# Patient Record
Sex: FEMALE | Race: WHITE | URBAN - NONMETROPOLITAN AREA
[De-identification: names, ages, dates, MRNs, and addresses within clinical notes are randomized per-mention and may not be internally consistent; named-entity substitution may affect disease eponyms.]

---

## 2017-05-12 ENCOUNTER — COMMUNICATION - GICH (OUTPATIENT)
Dept: FAMILY MEDICINE | Facility: OTHER | Age: 47
End: 2017-05-12

## 2018-01-05 NOTE — TELEPHONE ENCOUNTER
Patient Information     Patient Name MRN Yael Barroso [id number] Female 1970      Telephone Encounter by Kristy Herrmann RN at 5/15/2017  2:19 PM     Author:  Kristy Herrmann RN Service:  (none) Author Type:  (none)     Filed:  5/15/2017  2:19 PM Encounter Date:  2017 Status:  Signed     :  Kristy Herrmann RN (NURS- Registered Nurse)            Left message to call back  ....................Kristy Herrmann RN  5/15/2017   2:19 PM

## 2018-01-05 NOTE — TELEPHONE ENCOUNTER
Patient Information     Patient Name MRN Yael Barroso [id number] Female 1970      Telephone Encounter by Kristy Herrmann RN at 2017  9:41 AM     Author:  Kristy Herrmann RN Service:  (none) Author Type:  (none)     Filed:  2017  9:43 AM Encounter Date:  2017 Status:  Signed     :  Kristy Herrmann RN (NURS- Registered Nurse)            Left message to call back  ....................Kristy Herrmann RN  2017   9:42 AM

## 2018-01-05 NOTE — TELEPHONE ENCOUNTER
Patient Information     Patient Name MRN Yael Barroso [id number] Female 1970      Telephone Encounter by Fanny Christian RN at 2017  3:04 PM     Author:  Fanny Christian RN Service:  (none) Author Type:  NURS- Registered Nurse     Filed:  2017  3:05 PM Encounter Date:  2017 Status:  Signed     :  Fanny Christian RN (NURS- Registered Nurse)            Patient states she had a tick bite, [facility].  She does not need anything at this time.  FANNY CHRISTIAN RN ....................  2017   3:05 PM

## 2018-02-14 ENCOUNTER — DOCUMENTATION ONLY (OUTPATIENT)
Dept: FAMILY MEDICINE | Facility: OTHER | Age: 48
End: 2018-02-14

## 2018-02-14 PROBLEM — F31.9 BIPOLAR AFFECTIVE DISORDER (H): Status: ACTIVE | Noted: 2018-02-14

## 2018-02-14 PROBLEM — N60.19 FIBROCYSTIC BREAST DISEASE: Status: ACTIVE | Noted: 2018-02-14

## 2018-02-14 RX ORDER — OXYCODONE AND ACETAMINOPHEN 5; 325 MG/1; MG/1
1 TABLET ORAL EVERY 6 HOURS PRN
COMMUNITY
Start: 2012-10-30

## 2018-02-14 RX ORDER — IBUPROFEN 200 MG
1-2 TABLET ORAL EVERY 4 HOURS PRN
COMMUNITY

## 2018-02-14 RX ORDER — ESZOPICLONE 1 MG/1
1 TABLET, FILM COATED ORAL AT BEDTIME
COMMUNITY

## 2018-02-14 RX ORDER — IMIPRAMINE HYDROCHLORIDE 25 MG/1
TABLET ORAL
COMMUNITY

## 2018-02-14 RX ORDER — ALPRAZOLAM 0.5 MG
0.5 TABLET ORAL
COMMUNITY

## 2018-02-14 RX ORDER — DIVALPROEX SODIUM 250 MG/1
750 TABLET, DELAYED RELEASE ORAL AT BEDTIME
COMMUNITY

## 2018-02-14 RX ORDER — TRAZODONE HYDROCHLORIDE 50 MG/1
1-2 TABLET, FILM COATED ORAL AT BEDTIME
COMMUNITY

## 2018-02-14 RX ORDER — LITHIUM CARBONATE 300 MG/1
300 TABLET, FILM COATED, EXTENDED RELEASE ORAL 2 TIMES DAILY
COMMUNITY

## 2018-03-25 ENCOUNTER — HEALTH MAINTENANCE LETTER (OUTPATIENT)
Age: 48
End: 2018-03-25

## 2021-07-31 ENCOUNTER — TRANSFERRED RECORDS (OUTPATIENT)
Dept: HEALTH INFORMATION MANAGEMENT | Facility: OTHER | Age: 51
End: 2021-07-31